# Patient Record
Sex: FEMALE | Race: WHITE | NOT HISPANIC OR LATINO | URBAN - NONMETROPOLITAN AREA
[De-identification: names, ages, dates, MRNs, and addresses within clinical notes are randomized per-mention and may not be internally consistent; named-entity substitution may affect disease eponyms.]

---

## 2017-03-31 PROBLEM — H18.51: Noted: 2017-03-31

## 2017-03-31 PROBLEM — H10.45: Noted: 2017-10-23

## 2017-03-31 PROBLEM — H40.1131: Noted: 2017-10-23

## 2017-03-31 PROBLEM — H35.341: Noted: 2017-03-31

## 2019-02-04 ENCOUNTER — IMPORTED ENCOUNTER (OUTPATIENT)
Dept: URBAN - NONMETROPOLITAN AREA CLINIC 1 | Facility: CLINIC | Age: 79
End: 2019-02-04

## 2019-02-04 PROCEDURE — 92250 FUNDUS PHOTOGRAPHY W/I&R: CPT

## 2019-02-04 PROCEDURE — 99214 OFFICE O/P EST MOD 30 MIN: CPT

## 2019-02-04 NOTE — PATIENT DISCUSSION
POAG OU S/P SLT OU Discussed diagnosis with patientDiscussed the chronic progressive nature of this disease and various treatment optionsOptos done today; stable with glaucomatous cupping OU IOP at 18 OU if IOP reaches 20 recommend restart glacuoma dropsCup to Disc 0.3 OUContinue off drops Continue to monitorRTC in 3 months w/Gonio Allergic Conjunctivitis improved. Discussed diagnosis in detail with patientContinue systane PRN instead of clear eyesContinue to monitor ERM/Mac [facility] ODDiscussed diagnosis in detail with patientPatient is stable at this timeContinue to monitorGuttata OUDiscussed diagnosis in detail with patientPatient is stable at this timeContinue to monitor; 's Notes: MR 2/13/17DFE 7/27/18Optos 2/4/19OCT 11/2/18VF 7/27/18Gonio 4/23/18SLT OD 2/22/17SLT OS 3/22/17

## 2019-05-06 ENCOUNTER — IMPORTED ENCOUNTER (OUTPATIENT)
Dept: URBAN - NONMETROPOLITAN AREA CLINIC 1 | Facility: CLINIC | Age: 79
End: 2019-05-06

## 2019-05-06 PROCEDURE — 99214 OFFICE O/P EST MOD 30 MIN: CPT

## 2019-05-06 PROCEDURE — 92020 GONIOSCOPY: CPT

## 2019-05-06 NOTE — PATIENT DISCUSSION
POAG OU S/P SLT OU Discussed diagnosis with patientDiscussed the chronic progressive nature of this disease and various treatment optionsGonio done today; Grade IV with no pigment OUIOP at 18 OU if IOP reaches 20 recommend restart glacuoma dropsCup to Disc 0.3 OUContinue off drops Continue to monitorRTC in 3 months w/Vf 24-2Allergic Conjunctivitis improved. Discussed diagnosis in detail with patientContinue systane PRN instead of clear eyesContinue to monitor ERM/Mac [facility] ODDiscussed diagnosis in detail with patientPatient is stable at this timeContinue to monitorGuttata OUDiscussed diagnosis in detail with patientPatient is stable at this timeContinue to monitor; 's Notes: MR 2/13/17DFE 7/27/18Optos 2/4/19OCT 11/2/18VF 7/27/18Gonio 5/6/19SLT OD 2/22/17SLT OS 3/22/17

## 2019-08-05 ENCOUNTER — IMPORTED ENCOUNTER (OUTPATIENT)
Dept: URBAN - NONMETROPOLITAN AREA CLINIC 1 | Facility: CLINIC | Age: 79
End: 2019-08-05

## 2019-08-05 PROCEDURE — 92014 COMPRE OPH EXAM EST PT 1/>: CPT

## 2019-08-05 PROCEDURE — 92083 EXTENDED VISUAL FIELD XM: CPT

## 2019-08-05 NOTE — PATIENT DISCUSSION
POAG OUDiscussed diagnosis with patientDiscussed the chronic progressive nature of this disease and various treatment optionsVF done today:  Unreliable OD with non-specific defects. Reliable OS full with no defects. Gonio done previously Grade IV with no pigment OUIOP better at 17 OD and 16 OS recommend 18 OU or lower. Cup to Disc 0.3 OUContinue off drops at this time hx of SLT in March of 2017. Continue to monitorRTC in 3 months w/ OCT Allergic Conjunctivitis improved. Discussed diagnosis in detail with patientContinue systane PRN instead of clear eyesContinue to monitor ERM/Mac [facility] ODDiscussed diagnosis in detail with patientPatient is stable at this timeContinue to monitorGuttata OUDiscussed diagnosis in detail with patientPatient is stable at this timeContinue to monitor; 's Notes: MR 2/13/17DFE 7/27/18Optos 2/4/19OCT 11/2/18VF 8/5/19 Gonio 5/6/19SLT OD 2/22/17SLT OS 3/22/17

## 2019-11-11 ENCOUNTER — IMPORTED ENCOUNTER (OUTPATIENT)
Dept: URBAN - NONMETROPOLITAN AREA CLINIC 1 | Facility: CLINIC | Age: 79
End: 2019-11-11

## 2019-11-11 PROCEDURE — 99214 OFFICE O/P EST MOD 30 MIN: CPT

## 2019-11-11 PROCEDURE — 92133 CPTRZD OPH DX IMG PST SGM ON: CPT

## 2019-11-11 NOTE — PATIENT DISCUSSION
POAG OUDiscussed diagnosis with patientDiscussed the chronic progressive nature of this disease and various treatment optionsOCT done today; no NFL thinning OU. IOP at 16 OU Cup to Disc 0.3 OUContinue off drops at this time hx of SLT in March of 2017. Continue to monitorRTC in 3 months w/Optos Allergic Conjunctivitis improved. Discussed diagnosis in detail with patientContinue systane PRN instead of clear eyesContinue to monitor ERM/Mac [facility] ODDiscussed diagnosis in detail with patientPatient is stable at this timeContinue to monitorGuttata OUDiscussed diagnosis in detail with patientPatient is stable at this timeContinue to monitor; 's Notes: MR 2/13/17DFE 11/11/19Optos 2/4/19OCT 11/11/19VF 8/5/19 Gonio 5/6/19SLT OD 2/22/17SLT OS 3/22/17

## 2020-02-10 ENCOUNTER — IMPORTED ENCOUNTER (OUTPATIENT)
Dept: URBAN - NONMETROPOLITAN AREA CLINIC 1 | Facility: CLINIC | Age: 80
End: 2020-02-10

## 2020-02-10 PROCEDURE — 92250 FUNDUS PHOTOGRAPHY W/I&R: CPT

## 2020-02-10 PROCEDURE — 99214 OFFICE O/P EST MOD 30 MIN: CPT

## 2020-06-26 ENCOUNTER — IMPORTED ENCOUNTER (OUTPATIENT)
Dept: URBAN - NONMETROPOLITAN AREA CLINIC 1 | Facility: CLINIC | Age: 80
End: 2020-06-26

## 2020-06-26 PROCEDURE — 92020 GONIOSCOPY: CPT

## 2020-06-26 PROCEDURE — 99214 OFFICE O/P EST MOD 30 MIN: CPT

## 2020-06-26 NOTE — PATIENT DISCUSSION
POAG OUDiscussed diagnosis with patientDiscussed the chronic progressive nature of this disease and various treatment optionsGonio done today; Grade IV with no pigment OU. IOP at 15 OD and 14 OS. Cup to Disc 0.3 OUContinue off drops at this time hx of SLT in March of 2017. Continue to monitorRTC in 3 months w/VF 24-2Ocular Allergies OU. Discussed diagnosis in detail with patient2+papillae noted OU. Start Alrex BID OU X 2 weeks Rx sent to pharmacy. Continue systane PRN instead of clear eyesContinue to monitor. ERM/Mac [facility] ODDiscussed diagnosis in detail with patient. Patient is stable at this time. Continue to monitor. Guttata OUDiscussed diagnosis in detail with patient. Patient is stable at this time. Continue to monitor.; 's Notes: MR 2/13/17DFE 11/11/19Optos 2/10/20OCT 11/11/19VF 8/5/19 Gonio 6/26/20SLT OD 2/22/17SLT OS 3/22/17

## 2020-09-25 ENCOUNTER — IMPORTED ENCOUNTER (OUTPATIENT)
Dept: URBAN - NONMETROPOLITAN AREA CLINIC 1 | Facility: CLINIC | Age: 80
End: 2020-09-25

## 2020-09-25 PROCEDURE — 99214 OFFICE O/P EST MOD 30 MIN: CPT

## 2020-09-25 PROCEDURE — 92083 EXTENDED VISUAL FIELD XM: CPT

## 2020-09-25 NOTE — PATIENT DISCUSSION
POAG OUDiscussed diagnosis with patientDiscussed the chronic progressive nature of this disease and various treatment optionsVF done today; full no defects OU. IOP increased to 22 OD and 21 OS. Cup to Disc 0.3 OUContinue off drops at this time hx of SLT in March of 2017. Continue to monitorRTC in 3 months w/OCTOcular Allergies OU. Discussed diagnosis in detail with patient2+papillae noted OU. Continue Pazeo QD OU/PRN. Continue systane PRN instead of clear eyesContinue to monitor. ERM/Mac [facility] ODDiscussed diagnosis in detail with patient. Patient is stable at this time. Continue to monitor. Guttata OUDiscussed diagnosis in detail with patient. Patient is stable at this time. Continue to monitor.; 's Notes: MR 2/13/17DFE 11/11/19Optos 2/10/20OCT 11/11/19VF  9/25/20Gonio 6/26/20SLT OD 2/22/17SLT OS 3/22/17

## 2021-01-04 ENCOUNTER — IMPORTED ENCOUNTER (OUTPATIENT)
Dept: URBAN - NONMETROPOLITAN AREA CLINIC 1 | Facility: CLINIC | Age: 81
End: 2021-01-04

## 2021-01-04 PROCEDURE — 99214 OFFICE O/P EST MOD 30 MIN: CPT

## 2021-01-04 PROCEDURE — 92133 CPTRZD OPH DX IMG PST SGM ON: CPT

## 2021-04-05 ENCOUNTER — IMPORTED ENCOUNTER (OUTPATIENT)
Dept: URBAN - NONMETROPOLITAN AREA CLINIC 1 | Facility: CLINIC | Age: 81
End: 2021-04-05

## 2021-04-05 PROCEDURE — 99214 OFFICE O/P EST MOD 30 MIN: CPT

## 2021-04-05 PROCEDURE — 92250 FUNDUS PHOTOGRAPHY W/I&R: CPT

## 2021-04-05 NOTE — PATIENT DISCUSSION
POAG OUDiscussed diagnosis with patientDiscussed the chronic progressive nature of this disease and various treatment optionsOCT done previously; No NFL thinning OU. Optos done today; stable from previousIOP improved to 16 OU. Cup to Disc 0.3 OUContinue off drops at this time hx of SLT in March of 2017. Continue to monitorRTC in 6 months w/VF 24-2 and OCTOcular Allergies OU. Discussed diagnosis in detail with patient. Papillae improved on today's examContinue Pazeo QD OU/PRN. Continue systane PRN instead of clear eyesContinue to monitor. ERM/Mac [facility] ODDiscussed diagnosis in detail with patient. Patient is stable at this time. Continue to monitor. Guttata OUDiscussed diagnosis in detail with patient. Patient is stable at this time. Continue to monitor.; 's Notes: MR 2/13/17DFE  1/4/21Optos 4/5/21OCT 1/4/21VF  9/25/20Gonio 6/26/20SLT OD 2/22/17SLT OS 3/22/17

## 2021-10-11 ENCOUNTER — IMPORTED ENCOUNTER (OUTPATIENT)
Dept: URBAN - NONMETROPOLITAN AREA CLINIC 1 | Facility: CLINIC | Age: 81
End: 2021-10-11

## 2021-10-11 PROCEDURE — 99214 OFFICE O/P EST MOD 30 MIN: CPT

## 2021-10-11 PROCEDURE — 92133 CPTRZD OPH DX IMG PST SGM ON: CPT

## 2021-10-11 PROCEDURE — 92083 EXTENDED VISUAL FIELD XM: CPT

## 2021-10-11 NOTE — PATIENT DISCUSSION
POAG OUDiscussed diagnosis with patientDiscussed the chronic progressive nature of this disease and various treatment optionsOCT done today; No NFL thinning OU. VF done today; full no defect OD and rim artifact OS. Optos done previously; stable from previousIOP at 17 OD and 16 OS. Cup to Disc 0.3 OUContinue off drops at this time hx of SLT in March of 2017. Continue to monitorRTC in 6 months w/Optos and Gonio Ocular Allergies OU. Discussed diagnosis in detail with patient. Papillae improved on today's examContinue Pazeo QD OU/PRN. Continue systane PRN instead of clear eyesContinue to monitor. ERM/Mac [place] ODDiscussed diagnosis in detail with patient. Patient is stable at this time. Continue to monitor. Guttata OUDiscussed diagnosis in detail with patient. Patient is stable at this time. Continue to monitor.; 's Notes: MR 2/13/17DFE  1/4/21OCT  10/11/21VF  10/11/21Optos 4/5/21Gonio 6/26/20SLT OD 2/22/17SLT OS 3/22/17

## 2022-01-28 ENCOUNTER — EMERGENCY VISIT (OUTPATIENT)
Dept: URBAN - NONMETROPOLITAN AREA CLINIC 1 | Facility: CLINIC | Age: 82
End: 2022-01-28

## 2022-01-28 DIAGNOSIS — H35.341: ICD-10-CM

## 2022-01-28 DIAGNOSIS — H52.4: ICD-10-CM

## 2022-01-28 PROCEDURE — 99214 OFFICE O/P EST MOD 30 MIN: CPT

## 2022-01-28 PROCEDURE — 92134 CPTRZ OPH DX IMG PST SGM RTA: CPT

## 2022-01-28 ASSESSMENT — VISUAL ACUITY
OD_PH: 20/25-2
OD_SC: 20/40-2
OS_SC: 20/30-2

## 2022-01-28 ASSESSMENT — TONOMETRY
OS_IOP_MMHG: 18
OD_IOP_MMHG: 19

## 2022-04-09 ASSESSMENT — VISUAL ACUITY
OD_CC: 20/25
OD_CC: 20/30
OD_CC: 20/25-2
OD_CC: 20/29-
OS_CC: 20/25
OD_CC: 20/25+
OD_CC: 20/25-
OS_CC: 20/29-
OS_CC: 20/25-2
OD_CC: 20/30-1
OS_CC: 20/20-2
OS_CC: 20/30
OD_CC: 20/29
OS_CC: 20/29
OS_CC: 20/30-2
OS_CC: 20/25-
OD_CC: 20/25+1
OD_CC: 20/25
OS_CC: 20/20-2
OS_CC: 20/29-

## 2022-04-09 ASSESSMENT — TONOMETRY
OD_IOP_MMHG: 17
OS_IOP_MMHG: 21
OS_IOP_MMHG: 16
OD_IOP_MMHG: 22
OD_IOP_MMHG: 16
OD_IOP_MMHG: 16
OS_IOP_MMHG: 16
OS_IOP_MMHG: 16
OD_IOP_MMHG: 16
OD_IOP_MMHG: 18
OS_IOP_MMHG: 14
OS_IOP_MMHG: 17
OS_IOP_MMHG: 16
OD_IOP_MMHG: 15
OD_IOP_MMHG: 18
OS_IOP_MMHG: 16
OS_IOP_MMHG: 16
OD_IOP_MMHG: 17
OS_IOP_MMHG: 18
OD_IOP_MMHG: 16

## 2022-04-09 ASSESSMENT — PACHYMETRY
OS_CT_UM: 541; ADJ: THIN
OD_CT_UM: 542; ADJ: THIN
OS_CT_UM: 541; ADJ: THIN
OD_CT_UM: 542; ADJ: THIN
OD_CT_UM: 542; ADJ: THIN
OS_CT_UM: 541; ADJ: THIN
OD_CT_UM: 542; ADJ: THIN
OS_CT_UM: 541; ADJ: THIN
OD_CT_UM: 542; ADJ: THIN
OD_CT_UM: 542; ADJ: THIN
OS_CT_UM: 541; ADJ: THIN
OD_CT_UM: 542; ADJ: THIN
OD_CT_UM: 542; ADJ: THIN

## 2022-04-14 ENCOUNTER — FOLLOW UP (OUTPATIENT)
Dept: URBAN - NONMETROPOLITAN AREA CLINIC 1 | Facility: CLINIC | Age: 82
End: 2022-04-14

## 2022-04-14 DIAGNOSIS — H40.1131: ICD-10-CM

## 2022-04-14 PROCEDURE — 92250 FUNDUS PHOTOGRAPHY W/I&R: CPT

## 2022-04-14 PROCEDURE — 92020 GONIOSCOPY: CPT

## 2022-04-14 PROCEDURE — 99214 OFFICE O/P EST MOD 30 MIN: CPT

## 2022-04-14 ASSESSMENT — VISUAL ACUITY
OS_PH: 20/20
OS_SC: 20/40
OD_PH: 20/30
OD_SC: 20/40-2

## 2022-04-14 ASSESSMENT — TONOMETRY
OD_IOP_MMHG: 16
OS_IOP_MMHG: 16

## 2022-10-24 ENCOUNTER — FOLLOW UP (OUTPATIENT)
Dept: URBAN - NONMETROPOLITAN AREA CLINIC 1 | Facility: CLINIC | Age: 82
End: 2022-10-24

## 2022-10-24 DIAGNOSIS — H40.1131: ICD-10-CM

## 2022-10-24 PROCEDURE — 99214 OFFICE O/P EST MOD 30 MIN: CPT

## 2022-10-24 PROCEDURE — 92083 EXTENDED VISUAL FIELD XM: CPT

## 2022-10-24 PROCEDURE — 92133 CPTRZD OPH DX IMG PST SGM ON: CPT

## 2022-10-24 ASSESSMENT — VISUAL ACUITY
OD_PH: 20/30-1
OS_PH: 20/25
OD_SC: 20/40
OS_SC: 20/40

## 2022-10-24 ASSESSMENT — TONOMETRY
OD_IOP_MMHG: 15
OS_IOP_MMHG: 15

## 2022-10-24 NOTE — PATIENT DISCUSSION
Patient to continue with not using any gtts at this time. Patient advised to be compliant with gtts. Condition was discussed with patient and patient understands. Will continue to monitor patient for any progression in condition. Patient was advised to call us with any problems, questions, or concerns.

## 2023-04-24 ENCOUNTER — FOLLOW UP (OUTPATIENT)
Dept: URBAN - NONMETROPOLITAN AREA CLINIC 1 | Facility: CLINIC | Age: 83
End: 2023-04-24

## 2023-04-24 DIAGNOSIS — H40.1131: ICD-10-CM

## 2023-04-24 PROCEDURE — 92250 FUNDUS PHOTOGRAPHY W/I&R: CPT

## 2023-04-24 PROCEDURE — 99214 OFFICE O/P EST MOD 30 MIN: CPT

## 2023-04-24 PROCEDURE — 92020 GONIOSCOPY: CPT

## 2023-04-24 ASSESSMENT — VISUAL ACUITY
OD_SC: 20/40-1
OS_PH: 20/25
OS_SC: 20/40-1
OD_PH: 20/30-1
OU_SC: 20/30

## 2023-04-24 ASSESSMENT — TONOMETRY
OS_IOP_MMHG: 19
OD_IOP_MMHG: 19

## 2023-10-26 ENCOUNTER — FOLLOW UP (OUTPATIENT)
Dept: URBAN - NONMETROPOLITAN AREA CLINIC 1 | Facility: CLINIC | Age: 83
End: 2023-10-26

## 2023-10-26 DIAGNOSIS — H40.1131: ICD-10-CM

## 2023-10-26 PROCEDURE — 92083 EXTENDED VISUAL FIELD XM: CPT

## 2023-10-26 PROCEDURE — 92133 CPTRZD OPH DX IMG PST SGM ON: CPT

## 2023-10-26 PROCEDURE — 99214 OFFICE O/P EST MOD 30 MIN: CPT

## 2023-10-26 ASSESSMENT — TONOMETRY
OD_IOP_MMHG: 15
OS_IOP_MMHG: 16
OD_IOP_MMHG: 16

## 2023-10-26 ASSESSMENT — VISUAL ACUITY
OD_SC: 20/40-
OU_SC: 20/22-2
OS_SC: 20/40-
OS_PH: 20/29-
OD_PH: 20/29-

## 2024-04-29 ENCOUNTER — FOLLOW UP (OUTPATIENT)
Dept: URBAN - NONMETROPOLITAN AREA CLINIC 1 | Facility: CLINIC | Age: 84
End: 2024-04-29

## 2024-04-29 DIAGNOSIS — H40.1131: ICD-10-CM

## 2024-04-29 DIAGNOSIS — H43.811: ICD-10-CM

## 2024-04-29 DIAGNOSIS — H35.371: ICD-10-CM

## 2024-04-29 DIAGNOSIS — H35.341: ICD-10-CM

## 2024-04-29 PROCEDURE — 99213 OFFICE O/P EST LOW 20 MIN: CPT

## 2024-04-29 PROCEDURE — 92250 FUNDUS PHOTOGRAPHY W/I&R: CPT

## 2024-04-29 PROCEDURE — 92020 GONIOSCOPY: CPT

## 2024-04-29 ASSESSMENT — VISUAL ACUITY: OD_SC: 20/30-1

## 2024-04-29 ASSESSMENT — TONOMETRY
OS_IOP_MMHG: 16
OD_IOP_MMHG: 16

## 2024-11-04 ENCOUNTER — FOLLOW UP (OUTPATIENT)
Dept: URBAN - NONMETROPOLITAN AREA CLINIC 1 | Facility: CLINIC | Age: 84
End: 2024-11-04

## 2024-11-04 DIAGNOSIS — H35.341: ICD-10-CM

## 2024-11-04 DIAGNOSIS — H40.1131: ICD-10-CM

## 2024-11-04 DIAGNOSIS — H43.811: ICD-10-CM

## 2024-11-04 DIAGNOSIS — H35.371: ICD-10-CM

## 2024-11-04 PROCEDURE — 92083 EXTENDED VISUAL FIELD XM: CPT

## 2024-11-04 PROCEDURE — 99214 OFFICE O/P EST MOD 30 MIN: CPT

## 2024-11-04 PROCEDURE — 92133 CPTRZD OPH DX IMG PST SGM ON: CPT

## 2025-05-19 ENCOUNTER — FOLLOW UP (OUTPATIENT)
Age: 85
End: 2025-05-19

## 2025-05-19 DIAGNOSIS — H35.371: ICD-10-CM

## 2025-05-19 DIAGNOSIS — H43.811: ICD-10-CM

## 2025-05-19 DIAGNOSIS — Z96.1: ICD-10-CM

## 2025-05-19 DIAGNOSIS — H35.341: ICD-10-CM

## 2025-05-19 DIAGNOSIS — H40.1131: ICD-10-CM

## 2025-05-19 PROCEDURE — 99214 OFFICE O/P EST MOD 30 MIN: CPT

## 2025-05-19 PROCEDURE — 92020 GONIOSCOPY: CPT

## 2025-05-19 PROCEDURE — 92250 FUNDUS PHOTOGRAPHY W/I&R: CPT
